# Patient Record
(demographics unavailable — no encounter records)

---

## 2017-09-06 NOTE — DIAGNOSTIC IMAGING REPORT
Indication: PAIN



Technique: 3 views right foot



Comparison: none



Findings: No acute fractures. No dislocations. The joint spaces are preserved. There

is pes planus deformity. There is a small calcaneal spur. There is mild 

metatarsus

adductus and hallux valgus.



Impression: No acute process

## 2017-09-09 NOTE — EMERGENCY ROOM REPORT
History of Present Illness


General


Chief Complaint:  Skin Rash/Abscess


Source:  Patient





Present Illness


HPI


Patient is a 31-year-old female who presented after increased pain to her foot.

  The patient been present for several months.  This had been started by 

puncture wound to her foot.  She had noticed no fever she had some intermittent 

pain which is worse with standing for prolonged period time.  The patient was 

noted to have the no change in the swelling.  She reported having some 

generalized nausea without vomiting diarrhea.  She denied any fever or weight 

loss.  She denied any pain with extension or flexion of the toes


Allergies:  


Coded Allergies:  


     NO KNOWN DRUG ALLERGIES (Unverified  Allergy, Unknown, 2/17/15)





Patient History


Past Medical History:  see triage record


Last Menstrual Period:  IUD present.


Pregnant Now:  No


:  1


Reviewed Nursing Documentation:  PMH: Agreed, PSxH: Agreed





Nursing Documentation-PMH


Past Medical History:  No Stated History


Hx Cardiac Problems:  No - SICKLE CELL


Hx Diabetes:  Yes





Review of Systems


All Other Systems:  negative except mentioned in HPI





Physical Exam





Vital Signs








  Date Time  Temp Pulse Resp B/P (MAP) Pulse Ox O2 Delivery O2 Flow Rate FiO2


 


17 11:16 98.1 82 20 127/92 99 Room Air  








General Appearance:  well appearing, no apparent distress, alert, GCS 15


Head:  normocephalic, atraumatic


ENT:  hearing grossly normal, normal voice


Neck:  full range of motion, supple


Respiratory:  no respiratory distress, speaking full sentences


Cardiovascular #1:  normal inspection, normal peripheral pulses


Gastrointestinal:  normal inspection, normal bowel sounds, non tender, soft


Musculoskeletal:  no calf tenderness


Neurologic:  normal inspection, alert, oriented x3, responsive, CNs III-XII nml 

as tested, normal gait


Psychiatric:  mood/affect normal


Skin:  no rash, other - firm area to plantar foot without erythema or fluctuance

, no palpable foreign material





Medical Decision Making


Diagnostic Impression:  


 Primary Impression:  


 Gastritis


 Additional Impression:  


 Scar tissue


ER Course


Patient presented for foot pain.  Differential diagnoses include was was not 

limited to cellulitis, foreign body, fracture, plantar fasciitis, vascular 

insufficiency, sprain.  X-ray imaging of the foot 3 views read by radiology 

showed normal bony alignment without evident foreign body.  Patient was noted 

to have some scar tissue to the bottom her foot.  patient was advised to 

followup with orthopedics if pain persisted.  Urine pregnancy test was 

negative.  Patient was given prescription for medication for nonspecific 

abdominal pain.  She advised to return if she began having localized pain 

persistent vomiting or other concerns





Last Vital Signs








  Date Time  Temp Pulse Resp B/P (MAP) Pulse Ox O2 Delivery O2 Flow Rate FiO2


 


17 12:48 98.1 82 20 127/92 99 Room Air  








Status:  improved


Disposition:  HOME, SELF-CARE


Condition:  Stable


Scripts


Dicyclomine Hcl* (BENTYL*) 10 Mg Capsule


10 MG ORAL FOUR TIMES A DAY, #20 CAP


   Prov: Francisco Hutson         17 


Ondansetron Odt* (ZOFRAN ODT*) 4 Mg Tab.rapdis


4 MG ORAL Q6H Y for Nausea & Vomiting, #30 TAB 0 Refills


   Prov: Francisco Hutson         17


Patient Instructions:  Gastritis, Adult, Puncture Wound











Francisco Hutson Sep 9, 2017 19:57

## 2019-09-06 NOTE — NUR
ED Nurse Note:





Pt cleared to be d/c per [facility], pt discharge and aftercare instruction w/ 
prescription given, pt education done via discussion and handout, pt advised to 
follow up with pcp or return to ed if changes in condition, vss, ambulatory w/ 
steady gait, left w/ all belongings, accompanied by .

## 2019-09-06 NOTE — NUR
ED Nurse Note:

Pt AAO x4, vss, with no acute distress. Pt walked in c/o boil on LUQ and left 
armpit for couple of years but worsen today. Skin around boil is warm and 
tender. Friend at bedside.

## 2019-09-07 NOTE — EMERGENCY ROOM REPORT
History of Present Illness


General


Chief Complaint:  Skin Rash/Abscess


Source:  Patient





Present Illness


Allergies:  


Coded Allergies:  


     NO KNOWN DRUG ALLERGIES (Unverified  Allergy, Unknown, 2/17/15)





Patient History


Pregnant Now:  No





Nursing Documentation-[facility]


Past Medical History:  No Stated History


Hx Cardiac Problems:  No - SICKLE CELL


Hx Diabetes:  Yes





Physical Exam





Vital Signs








  Date Time  Temp Pulse Resp B/P (MAP) Pulse Ox O2 Delivery O2 Flow Rate FiO2


 


9/6/19 01:22 98.4 71 18 118/70 (86) 98 Room Air  











Medical Decision Making


Diagnostic Impression:  


 Primary Impression:  


 Skin abscess


 Additional Impressions:  


 Abscess


 Rash and other nonspecific skin eruption





Last Vital Signs








  Date Time  Temp Pulse Resp B/P (MAP) Pulse Ox O2 Delivery O2 Flow Rate FiO2


 


9/6/19 03:09 98.9 68 18 113/95 99 Room Air  








Status:  improved


Disposition:  HOME, SELF-CARE


Condition:  Stable


Scripts


Ibuprofen* (MOTRIN*) 600 Mg Tablet


600 MG ORAL Q8H PRN for For Pain, #30 TAB 0 Refills


   Prov: Francisco Hutson MD         9/6/19 


Trimethoprim/Sulfamethoxazole 160/800* (BACTRIM DS TABLET*) 1 Each Tablet


1 TAB ORAL Q12H, #14 TAB 0 Refills


   Prov: Francisco Hutson MD         9/6/19 


Cephalexin* (KEFLEX*) 500 Mg Capsule


500 MG ORAL EVERY 6 HOURS, #28 CAP


   Prov: Francisco Hutson MD         9/6/19


Referrals:  


NOT CHOSEN [facility]/MD,REFERRING (PCP)


Patient Instructions:  Francisco Hunt MD Sep 7, 2019 21:02